# Patient Record
Sex: MALE | Race: WHITE | NOT HISPANIC OR LATINO | URBAN - METROPOLITAN AREA
[De-identification: names, ages, dates, MRNs, and addresses within clinical notes are randomized per-mention and may not be internally consistent; named-entity substitution may affect disease eponyms.]

---

## 2017-08-15 ENCOUNTER — FOLLOW UP ESTABLISHED (OUTPATIENT)
Dept: URBAN - METROPOLITAN AREA CLINIC 51 | Facility: CLINIC | Age: 72
End: 2017-08-15
Payer: MEDICARE

## 2017-08-15 DIAGNOSIS — Z98.890 OTHER SPECIFIED POSTPROCEDURAL STATES: ICD-10-CM

## 2017-08-15 DIAGNOSIS — H18.59 OTHER HEREDITARY CORNEAL DYSTROPHIES: Primary | ICD-10-CM

## 2017-08-15 PROCEDURE — 92014 COMPRE OPH EXAM EST PT 1/>: CPT | Performed by: OPTOMETRIST

## 2017-08-15 ASSESSMENT — INTRAOCULAR PRESSURE
OS: 17
OD: 18

## 2017-08-15 ASSESSMENT — VISUAL ACUITY
OD: 20/20
OS: 20/20

## 2018-03-12 ENCOUNTER — FOLLOW UP ESTABLISHED (OUTPATIENT)
Dept: URBAN - METROPOLITAN AREA CLINIC 51 | Facility: CLINIC | Age: 73
End: 2018-03-12
Payer: MEDICARE

## 2018-03-12 DIAGNOSIS — H43.393 OTHER VITREOUS OPACITIES, BILATERAL: ICD-10-CM

## 2018-03-12 PROCEDURE — 92014 COMPRE OPH EXAM EST PT 1/>: CPT | Performed by: OPTOMETRIST

## 2018-03-12 ASSESSMENT — INTRAOCULAR PRESSURE
OS: 18
OD: 18

## 2018-03-12 ASSESSMENT — VISUAL ACUITY
OS: 20/20
OD: 20/20

## 2019-01-15 ENCOUNTER — FOLLOW UP ESTABLISHED (OUTPATIENT)
Dept: URBAN - METROPOLITAN AREA CLINIC 51 | Facility: CLINIC | Age: 74
End: 2019-01-15
Payer: MEDICARE

## 2019-01-15 PROCEDURE — 92014 COMPRE OPH EXAM EST PT 1/>: CPT | Performed by: OPTOMETRIST

## 2019-01-15 ASSESSMENT — KERATOMETRY
OS: 41.77
OD: 42.16

## 2019-01-15 ASSESSMENT — VISUAL ACUITY
OD: 20/20
OS: 20/20

## 2019-01-15 ASSESSMENT — INTRAOCULAR PRESSURE
OD: 17
OS: 18

## 2021-01-04 ENCOUNTER — FOLLOW UP ESTABLISHED (OUTPATIENT)
Dept: URBAN - METROPOLITAN AREA CLINIC 51 | Facility: CLINIC | Age: 76
End: 2021-01-04
Payer: MEDICARE

## 2021-01-04 DIAGNOSIS — Z96.1 PRESENCE OF INTRAOCULAR LENS: ICD-10-CM

## 2021-01-04 DIAGNOSIS — H43.813 VITREOUS DEGENERATION, BILATERAL: ICD-10-CM

## 2021-01-04 DIAGNOSIS — H18.593 OTHER HEREDITARY CORNEAL DYSTROPHIES: Primary | ICD-10-CM

## 2021-01-04 DIAGNOSIS — H52.4 PRESBYOPIA: ICD-10-CM

## 2021-01-04 DIAGNOSIS — H35.363 DRUSEN (DEGENERATIVE) OF MACULA, BILATERAL: ICD-10-CM

## 2021-01-04 PROCEDURE — 92014 COMPRE OPH EXAM EST PT 1/>: CPT | Performed by: OPTOMETRIST

## 2021-01-04 ASSESSMENT — INTRAOCULAR PRESSURE
OS: 18
OD: 16

## 2021-01-04 ASSESSMENT — KERATOMETRY
OD: 41.85
OS: 41.72

## 2021-01-04 ASSESSMENT — VISUAL ACUITY
OS: 20/20
OD: 20/20

## 2022-11-23 ENCOUNTER — OFFICE VISIT (OUTPATIENT)
Dept: URBAN - METROPOLITAN AREA CLINIC 51 | Facility: CLINIC | Age: 77
End: 2022-11-23
Payer: MEDICARE

## 2022-11-23 DIAGNOSIS — H35.363 DRUSEN (DEGENERATIVE) OF MACULA, BILATERAL: Primary | ICD-10-CM

## 2022-11-23 DIAGNOSIS — H18.593 OTHER HEREDITARY CORNEAL DYSTROPHIES: ICD-10-CM

## 2022-11-23 DIAGNOSIS — H52.4 PRESBYOPIA: ICD-10-CM

## 2022-11-23 DIAGNOSIS — H43.813 VITREOUS DEGENERATION, BILATERAL: ICD-10-CM

## 2022-11-23 PROCEDURE — 99214 OFFICE O/P EST MOD 30 MIN: CPT | Performed by: OPTOMETRIST

## 2022-11-23 PROCEDURE — 92134 CPTRZ OPH DX IMG PST SGM RTA: CPT | Performed by: OPTOMETRIST

## 2022-11-23 ASSESSMENT — KERATOMETRY
OD: 42.16
OS: 41.88

## 2022-11-23 ASSESSMENT — VISUAL ACUITY
OS: 20/25
OD: 20/25

## 2022-11-23 ASSESSMENT — INTRAOCULAR PRESSURE
OS: 17
OD: 16

## 2022-11-23 NOTE — IMPRESSION/PLAN
Impression: Drusen (degenerative) of macula, bilateral Plan: monitor at this time, no treatment needed.

## 2024-08-07 ENCOUNTER — OFFICE VISIT (OUTPATIENT)
Dept: URBAN - METROPOLITAN AREA CLINIC 51 | Facility: CLINIC | Age: 79
End: 2024-08-07
Payer: MEDICARE

## 2024-08-07 DIAGNOSIS — H04.123 TEAR FILM INSUFFICIENCY OF BILATERAL LACRIMAL GLANDS: ICD-10-CM

## 2024-08-07 DIAGNOSIS — H35.363 DRUSEN (DEGENERATIVE) OF MACULA, BILATERAL: Primary | ICD-10-CM

## 2024-08-07 DIAGNOSIS — H52.4 PRESBYOPIA: ICD-10-CM

## 2024-08-07 DIAGNOSIS — H43.813 VITREOUS DEGENERATION, BILATERAL: ICD-10-CM

## 2024-08-07 PROCEDURE — 92134 CPTRZ OPH DX IMG PST SGM RTA: CPT | Performed by: OPTOMETRIST

## 2024-08-07 PROCEDURE — 99214 OFFICE O/P EST MOD 30 MIN: CPT | Performed by: OPTOMETRIST

## 2024-08-07 ASSESSMENT — INTRAOCULAR PRESSURE
OD: 19
OS: 18

## 2024-08-07 ASSESSMENT — VISUAL ACUITY
OD: 20/20
OS: 20/20

## 2024-08-07 ASSESSMENT — KERATOMETRY
OS: 42.25
OD: 42.00